# Patient Record
Sex: MALE | Race: WHITE | NOT HISPANIC OR LATINO | URBAN - METROPOLITAN AREA
[De-identification: names, ages, dates, MRNs, and addresses within clinical notes are randomized per-mention and may not be internally consistent; named-entity substitution may affect disease eponyms.]

---

## 2017-03-28 ENCOUNTER — OFFICE VISIT (OUTPATIENT)
Dept: FAMILY MEDICINE | Facility: CLINIC | Age: 27
End: 2017-03-28
Payer: COMMERCIAL

## 2017-03-28 VITALS
HEIGHT: 68 IN | SYSTOLIC BLOOD PRESSURE: 130 MMHG | BODY MASS INDEX: 29.7 KG/M2 | RESPIRATION RATE: 16 BRPM | OXYGEN SATURATION: 99 % | WEIGHT: 196 LBS | DIASTOLIC BLOOD PRESSURE: 77 MMHG | HEART RATE: 70 BPM | TEMPERATURE: 98.4 F

## 2017-03-28 DIAGNOSIS — R10.11 ABDOMINAL PAIN, RIGHT UPPER QUADRANT: Primary | ICD-10-CM

## 2017-03-28 PROBLEM — F10.90 ALCOHOL USE DISORDER: Status: RESOLVED | Noted: 2017-03-28 | Resolved: 2017-03-28

## 2017-03-28 PROBLEM — F10.90 ALCOHOL USE DISORDER: Status: ACTIVE | Noted: 2017-03-28

## 2017-03-28 LAB
BASOPHILS # BLD AUTO: 0 10E9/L (ref 0–0.2)
BASOPHILS NFR BLD AUTO: 0.2 %
DIFFERENTIAL METHOD BLD: NORMAL
EOSINOPHIL # BLD AUTO: 0.1 10E9/L (ref 0–0.7)
EOSINOPHIL NFR BLD AUTO: 2 %
ERYTHROCYTE [DISTWIDTH] IN BLOOD BY AUTOMATED COUNT: 12.5 % (ref 10–15)
HCT VFR BLD AUTO: 47.5 % (ref 40–53)
HGB BLD-MCNC: 16.5 G/DL (ref 13.3–17.7)
LIPASE SERPL-CCNC: 119 U/L (ref 73–393)
LYMPHOCYTES # BLD AUTO: 0.9 10E9/L (ref 0.8–5.3)
LYMPHOCYTES NFR BLD AUTO: 13.2 %
MCH RBC QN AUTO: 32 PG (ref 26.5–33)
MCHC RBC AUTO-ENTMCNC: 34.7 G/DL (ref 31.5–36.5)
MCV RBC AUTO: 92 FL (ref 78–100)
MONOCYTES # BLD AUTO: 0.9 10E9/L (ref 0–1.3)
MONOCYTES NFR BLD AUTO: 13.5 %
NEUTROPHILS # BLD AUTO: 4.7 10E9/L (ref 1.6–8.3)
NEUTROPHILS NFR BLD AUTO: 71.1 %
PLATELET # BLD AUTO: 242 10E9/L (ref 150–450)
RBC # BLD AUTO: 5.15 10E12/L (ref 4.4–5.9)
WBC # BLD AUTO: 6.6 10E9/L (ref 4–11)

## 2017-03-28 PROCEDURE — 80053 COMPREHEN METABOLIC PANEL: CPT | Performed by: NURSE PRACTITIONER

## 2017-03-28 PROCEDURE — 83690 ASSAY OF LIPASE: CPT | Performed by: NURSE PRACTITIONER

## 2017-03-28 PROCEDURE — 82150 ASSAY OF AMYLASE: CPT | Performed by: NURSE PRACTITIONER

## 2017-03-28 PROCEDURE — 85025 COMPLETE CBC W/AUTO DIFF WBC: CPT | Performed by: NURSE PRACTITIONER

## 2017-03-28 PROCEDURE — 36415 COLL VENOUS BLD VENIPUNCTURE: CPT | Performed by: NURSE PRACTITIONER

## 2017-03-28 PROCEDURE — 99204 OFFICE O/P NEW MOD 45 MIN: CPT | Performed by: NURSE PRACTITIONER

## 2017-03-28 NOTE — PROGRESS NOTES
"  SUBJECTIVE:                                                    Chacho Dey is a 26 year old male who presents to clinic today for the following health issues:  Chief Complaint   Patient presents with     Establish Care     Abdominal Pain         He \"has had\" problems with alcohol. He quit drinking 2-3 years ago and then restarted (2-3 months ago).  He restarted as a social thing and then it became nightly.   It then became 3-4 cocktails/whiskey per night.  He stopped drinking 3 days ago.  He does not necessarily consider alcohol an addiction or a problem.  He is motivated to not drink and he is ready to be healthy.    He is in clinic today due to an abdominal concern.  RUQ abdominal pain.  Started 2-3 days ago.  He woke up with the pain.  No fever, chills.  No vomiting, diarrhea.    Getting .  He would like to have a family some day    Dad: healthy.  Mom: passed away, lung cancer, 42 years old.   Chacho is a nonsmoker.  Working full time as an .     Problem list and histories reviewed & adjusted, as indicated.  Additional history: as documented    Patient Active Problem List   Diagnosis     Pain in joint, lower leg     History reviewed. No pertinent surgical history.    Social History   Substance Use Topics     Smoking status: Never Smoker     Smokeless tobacco: Not on file     Alcohol use Yes     Family History   Problem Relation Age of Onset     Lung Cancer Mother      Alzheimer Disease Maternal Grandmother      Heart Failure Paternal Grandfather          No current outpatient prescriptions on file.     No Known Allergies  No lab results found.   BP Readings from Last 3 Encounters:   03/28/17 130/77    Wt Readings from Last 3 Encounters:   03/28/17 196 lb (88.9 kg)           Labs reviewed in EPIC    Reviewed and updated as needed this visit by clinical staff  Tobacco  Allergies  Meds  Med Hx  Surg Hx  Fam Hx  Soc Hx      Reviewed and updated as needed this visit by Provider     " "    ROS:  Constitutional, HEENT, cardiovascular, pulmonary, gi and gu systems are negative, except as otherwise noted.    OBJECTIVE:                                                    /77  Pulse 70  Temp 98.4  F (36.9  C) (Oral)  Resp 16  Ht 5' 7.75\" (1.721 m)  Wt 196 lb (88.9 kg)  SpO2 99%  BMI 30.02 kg/m2  Body mass index is 30.02 kg/(m^2).  Constitutional: healthy, alert and no distress  Cardiovascular: RRR. No murmurs, clicks gallops or rub  HEENT: normal  Neck: supple, no adenopathy  Respiratory: Respirations easy and regular. No respiratory distress noted. Lung sounds clear to auscultation.  Gastrointestinal: abdomen flat, soft, nontender to light or deep palpation. Bowel sounds active in 4 quadrants. No hepatosplenomegaly. No rebound or guarding.  Neurologic: Gait normal. Reflexes normal and symmetric. Sensation grossly WNL.  Psychiatric: mentation appears normal and affect normal/bright         ASSESSMENT/PLAN:                                                    (R10.11) Abdominal pain, right upper quadrant  (primary encounter diagnosis)  Comment: acute  Plan: CBC with platelets and differential,         Comprehensive metabolic panel, Amylase, Lipase        I did stress the importance of abstaining from alcohol.   See below.    Patient Instructions     For your abdominal pain, monitor your symptoms for now.  The blood test results will be back in 1-2 days.  I will let you know what we need to do for follow up.  If at any time your symptoms worsen or you are more concerned about the pain, I would order an abdominal ultrasound.    For now, eat healthy, drink plenty of water/fluids, rest, avoid alcohol, etc.  Follow up with me at any time.       Abdominal Pain  Abdominal pain is pain in the stomach or intestinal area. Everyone has this pain from time to time. In many cases it goes away on its own. But abdominal pain can sometimes be due to a serious problem, such as appendicitis. So it s important " to know when to seek help.  Causes of abdominal pain  There are many possible causes of abdominal pain. Common causes in adults include:    Constipation, diarrhea, or gas    GERD (gastroesophageal reflux disease) movement of stomach acid into the esophagus, also known as acid reflux or heartburn    Peptic ulcer (a sore in the lining of the stomach or small intestine)    Inflammation of the gallbladder, liver, or pancreas    Gallstones or kidney stones    Appendicitis     Obstruction of the intestines     Hernia (bulging of an internal organ through a muscle or other tissue)    Urinary tract infections    In women, menstrual cramps, fibroids, or endometriosis of the uterus    Inflammation or infection of the intestines  Diagnosing the cause of abdominal pain  Your health care provider will examine you to help find the cause of your pain. If needed, tests will be ordered. Because abdominal pain has so many possible causes, it can be hard to discover the reason for the pain. Giving details about your pain can help. Be ready to tell your health care provider where and when you feel the pain and what makes it better or worse. Also mention whether you have other symptoms such as fever, tiredness, nausea, vomiting, or changes in bathroom habits.  Treating abdominal pain  Certain causes of pain, such as appendicitis or a bowel obstruction, need emergency treatment. Other problems can be treated with rest, fluids, or medications. Your health care provider can give you specific instructions for treatment or self-care based on the cause of your pain.  If you have vomiting or diarrhea, sip water or other clear fluids. When you are ready to eat solid foods again, start with small amounts of easy-to-digest, low-fat foods, such as applesauce, toast, or crackers.   When to call the doctor  Call 911 or go to the hospital right away if you:    Can t pass stool and are vomiting    Are vomiting blood or have black, tarry  diarrhea    Also have chest, neck, or shoulder pain    Feel like you are about to pass out    Have pain in your shoulder blades with nausea    Have sudden, excruciating abdominal pain    Have new, severe pain unlike any you have felt before    Have a belly that is rigid, hard, and tender to touch  Call your doctor if you have:    Pain for more than 5 days    Bloating for more than 2 days    Diarrhea for more than 5 days    Fever of 101 F (38.3 C) or higher    Pain that continues to worsen    Unexplained weight loss    Continued lack of appetite    Blood in the stool  How to prevent abdominal pain  Here are some tips to help prevent abdominal pain:    Eat smaller amounts of food at one time.    Avoid greasy, fried, or other high-fat foods.    Avoid foods that give you gas.    Exercise regularly.    Drink plenty of fluids.  To help prevent symptoms of gastroesophageal reflux disease (GERD):    Quit smoking.    Reduce alcohol and certain foods that increase stomach acid.     Lose excess weight.    Finish eating at least 2 hours before you go to bed or lie down.    Elevate the head of your bed.    7315-5232 [employer]. [address] [place] [address]. All rights reserved. This information is not intended as a substitute for professional medical care. Always follow your healthcare professional's instructions.          Total: 30 min spent with the patient, >50% time spent face to face counseling regarding establishing care, discussing abd pain, tests, discussion/AVS review, importance of follow up, etc.    ADAM Phelps [facility]

## 2017-03-28 NOTE — PATIENT INSTRUCTIONS
For your abdominal pain, monitor your symptoms for now.  The blood test results will be back in 1-2 days.  I will let you know what we need to do for follow up.  If at any time your symptoms worsen or you are more concerned about the pain, I would order an abdominal ultrasound.    For now, eat healthy, drink plenty of water/fluids, rest, avoid alcohol, etc.  Follow up with me at any time.       Abdominal Pain  Abdominal pain is pain in the stomach or intestinal area. Everyone has this pain from time to time. In many cases it goes away on its own. But abdominal pain can sometimes be due to a serious problem, such as appendicitis. So it s important to know when to seek help.  Causes of abdominal pain  There are many possible causes of abdominal pain. Common causes in adults include:    Constipation, diarrhea, or gas    GERD (gastroesophageal reflux disease) movement of stomach acid into the esophagus, also known as acid reflux or heartburn    Peptic ulcer (a sore in the lining of the stomach or small intestine)    Inflammation of the gallbladder, liver, or pancreas    Gallstones or kidney stones    Appendicitis     Obstruction of the intestines     Hernia (bulging of an internal organ through a muscle or other tissue)    Urinary tract infections    In women, menstrual cramps, fibroids, or endometriosis of the uterus    Inflammation or infection of the intestines  Diagnosing the cause of abdominal pain  Your health care provider will examine you to help find the cause of your pain. If needed, tests will be ordered. Because abdominal pain has so many possible causes, it can be hard to discover the reason for the pain. Giving details about your pain can help. Be ready to tell your health care provider where and when you feel the pain and what makes it better or worse. Also mention whether you have other symptoms such as fever, tiredness, nausea, vomiting, or changes in bathroom habits.  Treating abdominal pain  Certain  causes of pain, such as appendicitis or a bowel obstruction, need emergency treatment. Other problems can be treated with rest, fluids, or medications. Your health care provider can give you specific instructions for treatment or self-care based on the cause of your pain.  If you have vomiting or diarrhea, sip water or other clear fluids. When you are ready to eat solid foods again, start with small amounts of easy-to-digest, low-fat foods, such as applesauce, toast, or crackers.   When to call the doctor  Call 911 or go to the hospital right away if you:    Can t pass stool and are vomiting    Are vomiting blood or have black, tarry diarrhea    Also have chest, neck, or shoulder pain    Feel like you are about to pass out    Have pain in your shoulder blades with nausea    Have sudden, excruciating abdominal pain    Have new, severe pain unlike any you have felt before    Have a belly that is rigid, hard, and tender to touch  Call your doctor if you have:    Pain for more than 5 days    Bloating for more than 2 days    Diarrhea for more than 5 days    Fever of 101 F (38.3 C) or higher    Pain that continues to worsen    Unexplained weight loss    Continued lack of appetite    Blood in the stool  How to prevent abdominal pain  Here are some tips to help prevent abdominal pain:    Eat smaller amounts of food at one time.    Avoid greasy, fried, or other high-fat foods.    Avoid foods that give you gas.    Exercise regularly.    Drink plenty of fluids.  To help prevent symptoms of gastroesophageal reflux disease (GERD):    Quit smoking.    Reduce alcohol and certain foods that increase stomach acid.     Lose excess weight.    Finish eating at least 2 hours before you go to bed or lie down.    Elevate the head of your bed.    1502-1440 [employer]. [address] [place] [address]. All rights reserved. This information is not intended as a substitute for professional medical care. Always follow  your healthcare professional's instructions.

## 2017-03-28 NOTE — MR AVS SNAPSHOT
After Visit Summary   3/28/2017    Chacho Dey    MRN: [id number]           Patient Information     Date Of Birth          1990        Visit Information        Provider Department      3/28/2017 11:40 AM Kelly Lazaro APRN [facility]        Today's Diagnoses     Abdominal pain, right upper quadrant    -  1      Care Instructions    For your abdominal pain, monitor your symptoms for now.  The blood test results will be back in 1-2 days.  I will let you know what we need to do for follow up.  If at any time your symptoms worsen or you are more concerned about the pain, I would order an abdominal ultrasound.    For now, eat healthy, drink plenty of water/fluids, rest, avoid alcohol, etc.  Follow up with me at any time.       Abdominal Pain  Abdominal pain is pain in the stomach or intestinal area. Everyone has this pain from time to time. In many cases it goes away on its own. But abdominal pain can sometimes be due to a serious problem, such as appendicitis. So it s important to know when to seek help.  Causes of abdominal pain  There are many possible causes of abdominal pain. Common causes in adults include:    Constipation, diarrhea, or gas    GERD (gastroesophageal reflux disease) movement of stomach acid into the esophagus, also known as acid reflux or heartburn    Peptic ulcer (a sore in the lining of the stomach or small intestine)    Inflammation of the gallbladder, liver, or pancreas    Gallstones or kidney stones    Appendicitis     Obstruction of the intestines     Hernia (bulging of an internal organ through a muscle or other tissue)    Urinary tract infections    In women, menstrual cramps, fibroids, or endometriosis of the uterus    Inflammation or infection of the intestines  Diagnosing the cause of abdominal pain  Your health care provider will examine you to help find the cause of your pain. If needed, tests will be ordered. Because abdominal  pain has so many possible causes, it can be hard to discover the reason for the pain. Giving details about your pain can help. Be ready to tell your health care provider where and when you feel the pain and what makes it better or worse. Also mention whether you have other symptoms such as fever, tiredness, nausea, vomiting, or changes in bathroom habits.  Treating abdominal pain  Certain causes of pain, such as appendicitis or a bowel obstruction, need emergency treatment. Other problems can be treated with rest, fluids, or medications. Your health care provider can give you specific instructions for treatment or self-care based on the cause of your pain.  If you have vomiting or diarrhea, sip water or other clear fluids. When you are ready to eat solid foods again, start with small amounts of easy-to-digest, low-fat foods, such as applesauce, toast, or crackers.   When to call the doctor  Call 911 or go to the hospital right away if you:    Can t pass stool and are vomiting    Are vomiting blood or have black, tarry diarrhea    Also have chest, neck, or shoulder pain    Feel like you are about to pass out    Have pain in your shoulder blades with nausea    Have sudden, excruciating abdominal pain    Have new, severe pain unlike any you have felt before    Have a belly that is rigid, hard, and tender to touch  Call your doctor if you have:    Pain for more than 5 days    Bloating for more than 2 days    Diarrhea for more than 5 days    Fever of 101 F (38.3 C) or higher    Pain that continues to worsen    Unexplained weight loss    Continued lack of appetite    Blood in the stool  How to prevent abdominal pain  Here are some tips to help prevent abdominal pain:    Eat smaller amounts of food at one time.    Avoid greasy, fried, or other high-fat foods.    Avoid foods that give you gas.    Exercise regularly.    Drink plenty of fluids.  To help prevent symptoms of gastroesophageal reflux disease (GERD):    Quit  "smoking.    Reduce alcohol and certain foods that increase stomach acid.     Lose excess weight.    Finish eating at least 2 hours before you go to bed or lie down.    Elevate the head of your bed.    2217-4716 [employer]. [address] [place] [address]. All rights reserved. This information is not intended as a substitute for professional medical care. Always follow your healthcare professional's instructions.              Follow-ups after your visit        Who to contact     If you have questions or need follow up information about today's clinic visit or your schedule please contact [facility] directly at [phone].  Normal or non-critical lab and imaging results will be communicated to you by [employer], letter or phone within 4 business days after the clinic has received the results. If you do not hear from us within 7 days, please contact the clinic through [employer] or phone. If you have a critical or abnormal lab result, we will notify you by phone as soon as possible.  Submit refill requests through [employer] or call your pharmacy and they will forward the refill request to us. Please allow 3 business days for your refill to be completed.          Additional Information About Your Visit        [employer] Information     [employer] lets you send messages to your doctor, view your test results, renew your prescriptions, schedule appointments and more. To sign up, go to [email or web address] . Click on \"Log in\" on the left side of the screen, which will take you to the Welcome page. Then click on \"Sign up Now\" on the right side of the page.     You will be asked to enter the access code listed below, as well as some personal information. Please follow the directions to create your username and password.     Your access code is: [email or web address]  Expires: 2017 12:09 PM     Your access code will  in 90 days. If you need help or a new code, please call your [place] " "clinic or [phone].        Care EveryWhere ID     This is your Care EveryWhere ID. This could be used by other organizations to access your [facility] medical records  [id number]        Your Vitals Were     Pulse Temperature Respirations Height Pulse Oximetry BMI (Body Mass Index)    70 98.4  F (36.9  C) (Oral) 16 5' 7.75\" (1.721 m) 99% 30.02 kg/m2       Blood Pressure from Last 3 Encounters:   03/28/17 130/77    Weight from Last 3 Encounters:   03/28/17 196 lb (88.9 kg)              We Performed the Following     Amylase     CBC with platelets and differential     Comprehensive metabolic panel     Lipase        Primary Care Provider    None Specified       No primary provider on file.        Thank you!     Thank you for choosing [facility]  for your care. Our goal is always to provide you with excellent care. Hearing back from our patients is one way we can continue to improve our services. Please take a few minutes to complete the written survey that you may receive in the mail after your visit with us. Thank you!             Your Updated Medication List - Protect others around you: Learn how to safely use, store and throw away your medicines at www.disposemymeds.org.      Notice  As of 3/28/2017 12:09 PM    You have not been prescribed any medications.      "

## 2017-03-29 LAB
ALBUMIN SERPL-MCNC: 4.1 G/DL (ref 3.4–5)
ALP SERPL-CCNC: 75 U/L (ref 40–150)
ALT SERPL W P-5'-P-CCNC: 66 U/L (ref 0–70)
AMYLASE SERPL-CCNC: 45 U/L (ref 30–110)
ANION GAP SERPL CALCULATED.3IONS-SCNC: 6 MMOL/L (ref 3–14)
AST SERPL W P-5'-P-CCNC: 36 U/L (ref 0–45)
BILIRUB SERPL-MCNC: 0.8 MG/DL (ref 0.2–1.3)
BUN SERPL-MCNC: 14 MG/DL (ref 7–30)
CALCIUM SERPL-MCNC: 9.4 MG/DL (ref 8.5–10.1)
CHLORIDE SERPL-SCNC: 105 MMOL/L (ref 94–109)
CO2 SERPL-SCNC: 28 MMOL/L (ref 20–32)
CREAT SERPL-MCNC: 1.11 MG/DL (ref 0.66–1.25)
GFR SERPL CREATININE-BSD FRML MDRD: 80 ML/MIN/1.7M2
GLUCOSE SERPL-MCNC: 88 MG/DL (ref 70–99)
POTASSIUM SERPL-SCNC: 5 MMOL/L (ref 3.4–5.3)
PROT SERPL-MCNC: 7.4 G/DL (ref 6.8–8.8)
SODIUM SERPL-SCNC: 139 MMOL/L (ref 133–144)

## 2017-03-29 NOTE — PROGRESS NOTES
Tomi Flor,    This note is to let you know the excellent news that all of your blood test results came back negative or normal. There is no sign of an infection, anemia, or liver issues. I would recommend that you continue to take good care of yourself with a healthy diet, exercise, and avoiding alcohol.    Let me know if you have any questions or concerns.    Kelly MEDINA CNP

## 2017-11-10 ENCOUNTER — OFFICE VISIT - HEALTHEAST (OUTPATIENT)
Dept: INTERNAL MEDICINE | Facility: CLINIC | Age: 27
End: 2017-11-10

## 2017-11-10 DIAGNOSIS — S39.011A ABDOMINAL MUSCLE STRAIN: ICD-10-CM

## 2017-11-10 ASSESSMENT — MIFFLIN-ST. JEOR: SCORE: 1842.62

## 2017-12-14 ENCOUNTER — COMMUNICATION - HEALTHEAST (OUTPATIENT)
Dept: SCHEDULING | Facility: CLINIC | Age: 27
End: 2017-12-14

## 2019-03-14 ENCOUNTER — COMMUNICATION - HEALTHEAST (OUTPATIENT)
Dept: INTERNAL MEDICINE | Facility: CLINIC | Age: 29
End: 2019-03-14

## 2019-03-29 ENCOUNTER — AMBULATORY - HEALTHEAST (OUTPATIENT)
Dept: INTERNAL MEDICINE | Facility: CLINIC | Age: 29
End: 2019-03-29

## 2019-03-29 ENCOUNTER — OFFICE VISIT - HEALTHEAST (OUTPATIENT)
Dept: INTERNAL MEDICINE | Facility: CLINIC | Age: 29
End: 2019-03-29

## 2019-03-29 ENCOUNTER — COMMUNICATION - HEALTHEAST (OUTPATIENT)
Dept: INTERNAL MEDICINE | Facility: CLINIC | Age: 29
End: 2019-03-29

## 2019-03-29 DIAGNOSIS — Z00.00 ROUTINE GENERAL MEDICAL EXAMINATION AT A HEALTH CARE FACILITY: ICD-10-CM

## 2019-03-29 DIAGNOSIS — R79.89 ABNORMAL LFTS: ICD-10-CM

## 2019-03-29 DIAGNOSIS — F41.1 GENERALIZED ANXIETY DISORDER: ICD-10-CM

## 2019-03-29 LAB
ALBUMIN SERPL-MCNC: 4.1 G/DL (ref 3.5–5)
ALP SERPL-CCNC: 59 U/L (ref 45–120)
ALT SERPL W P-5'-P-CCNC: 90 U/L (ref 0–45)
ANION GAP SERPL CALCULATED.3IONS-SCNC: 10 MMOL/L (ref 5–18)
AST SERPL W P-5'-P-CCNC: 41 U/L (ref 0–40)
BILIRUB SERPL-MCNC: 0.5 MG/DL (ref 0–1)
BUN SERPL-MCNC: 15 MG/DL (ref 8–22)
CALCIUM SERPL-MCNC: 9.5 MG/DL (ref 8.5–10.5)
CHLORIDE BLD-SCNC: 103 MMOL/L (ref 98–107)
CHOLEST SERPL-MCNC: 224 MG/DL
CO2 SERPL-SCNC: 26 MMOL/L (ref 22–31)
CREAT SERPL-MCNC: 1.1 MG/DL (ref 0.7–1.3)
FASTING STATUS PATIENT QL REPORTED: YES
GFR SERPL CREATININE-BSD FRML MDRD: >60 ML/MIN/1.73M2
GLUCOSE BLD-MCNC: 85 MG/DL (ref 70–125)
HDLC SERPL-MCNC: 58 MG/DL
HGB BLD-MCNC: 16.3 G/DL (ref 14–18)
LDLC SERPL CALC-MCNC: 146 MG/DL
POTASSIUM BLD-SCNC: 4.2 MMOL/L (ref 3.5–5)
PROT SERPL-MCNC: 7.2 G/DL (ref 6–8)
SODIUM SERPL-SCNC: 139 MMOL/L (ref 136–145)
TRIGL SERPL-MCNC: 101 MG/DL

## 2019-03-29 ASSESSMENT — MIFFLIN-ST. JEOR: SCORE: 1887.62

## 2019-04-04 ENCOUNTER — COMMUNICATION - HEALTHEAST (OUTPATIENT)
Dept: INTERNAL MEDICINE | Facility: CLINIC | Age: 29
End: 2019-04-04

## 2019-05-03 ENCOUNTER — RECORDS - HEALTHEAST (OUTPATIENT)
Dept: ADMINISTRATIVE | Facility: OTHER | Age: 29
End: 2019-05-03

## 2019-11-07 ENCOUNTER — HEALTH MAINTENANCE LETTER (OUTPATIENT)
Age: 29
End: 2019-11-07

## 2020-03-03 ENCOUNTER — OFFICE VISIT - HEALTHEAST (OUTPATIENT)
Dept: INTERNAL MEDICINE | Facility: CLINIC | Age: 30
End: 2020-03-03

## 2020-03-03 DIAGNOSIS — F41.1 GENERALIZED ANXIETY DISORDER: ICD-10-CM

## 2020-03-03 DIAGNOSIS — R79.89 ABNORMAL LFTS: ICD-10-CM

## 2020-03-03 DIAGNOSIS — E78.00 HYPERCHOLESTEROLEMIA: ICD-10-CM

## 2020-03-03 DIAGNOSIS — R04.0 EPISTAXIS: ICD-10-CM

## 2020-03-03 RX ORDER — ESCITALOPRAM OXALATE 10 MG/1
10 TABLET ORAL DAILY
Qty: 90 TABLET | Refills: 3 | Status: SHIPPED | OUTPATIENT
Start: 2020-03-03

## 2020-03-03 ASSESSMENT — PATIENT HEALTH QUESTIONNAIRE - PHQ9: SUM OF ALL RESPONSES TO PHQ QUESTIONS 1-9: 3

## 2020-11-29 ENCOUNTER — HEALTH MAINTENANCE LETTER (OUTPATIENT)
Age: 30
End: 2020-11-29

## 2021-05-27 ENCOUNTER — RECORDS - HEALTHEAST (OUTPATIENT)
Dept: ADMINISTRATIVE | Facility: CLINIC | Age: 31
End: 2021-05-27

## 2021-05-27 ASSESSMENT — PATIENT HEALTH QUESTIONNAIRE - PHQ9: SUM OF ALL RESPONSES TO PHQ QUESTIONS 1-9: 3

## 2021-05-27 NOTE — TELEPHONE ENCOUNTER
Who is calling:   Patient  Reason for Call:   Patient would like a call back regarding his labs, he received his letter.  Date of last appointment with primary care:  3/29/2019  Okay to leave a detailed message: Yes

## 2021-05-27 NOTE — TELEPHONE ENCOUNTER
I attempted to call him back but was not available at number.  My letter was pretty self-explanatory.  Does he have a specific question?

## 2021-05-27 NOTE — TELEPHONE ENCOUNTER
Called pt. Reread the letter to pt and he feels better. He will get labs checked in 6-8 weeks as advised.

## 2021-05-27 NOTE — TELEPHONE ENCOUNTER
Please call patient re lab results.    Your liver enzymes, AST and ALT, are mildly elevated.  This suggests some mild inflammation in your liver and I suspect is related to your current alcohol use.  You mentioned having several drinks a few times per week.  Any time you are drinking more than 2 beers or 2 ounces of hard alcohol in a day on a regular basis, your liver usually is not too happy.  You really should cut back significantly on your current use.  I would suggest rechecking the liver enzymes in 6-8 weeks after you have made some changes.  You can call at that time and schedule a lab appointment.     Your cholesterol is mildly elevated.  The LDL cholesterol should be under 130 and the total cholesterol should be under 200.  Modifications in diet and exercise should help.  Cutting back on alcohol will help this as well.     Everything else looks fine.  No diabetes.  Normal kidney function.  No anemia.

## 2021-05-31 VITALS — HEIGHT: 68 IN | WEIGHT: 201.08 LBS | BODY MASS INDEX: 30.48 KG/M2

## 2021-06-02 VITALS — BODY MASS INDEX: 31.98 KG/M2 | WEIGHT: 211 LBS | HEIGHT: 68 IN

## 2021-06-04 VITALS
SYSTOLIC BLOOD PRESSURE: 132 MMHG | OXYGEN SATURATION: 98 % | BODY MASS INDEX: 33.7 KG/M2 | HEART RATE: 70 BPM | WEIGHT: 220 LBS | DIASTOLIC BLOOD PRESSURE: 86 MMHG

## 2021-06-06 NOTE — PROGRESS NOTES
Office Visit - Follow Up   Chacho Dey   29 y.o. male    Date of Visit: 3/3/2020    Chief Complaint   Patient presents with     Medication Check     not fasting     Anxiety        Assessment and Plan   1. Generalized anxiety disorder  Anxiety is much better controlled using Lexapro 10 mg daily and tolerating without side effects.  - escitalopram oxalate (LEXAPRO) 10 MG tablet; Take 1 tablet (10 mg total) by mouth daily.  Dispense: 90 tablet; Refill: 3    2. Abnormal LFTs  Mildly elevated LFTs at his physical last year and using more alcohol than ideal.  He understands the importance of trying to cut back and he has good intentions of making changes.  He will work on this, more regular exercise with weight loss and return in 3 to 4 months to have his LFTs rechecked  - Hepatic Profile; Future    3. Hypercholesterolemia  Mildly elevated cholesterol last year.  He would like a few months to start making changes on his diet, exercise and alcohol intake and return for lipid profile this summer.  - Lipid Cascade; Future    4. Epistaxis with chronic nasal congestion.  Recommending he discussed with ENT and names provided.  He has tried Flonase without benefit.  Recommending normal saline spray 3-4 times daily.    Return in about 1 year (around 3/3/2021) for Annual physical.     History of Present Illness   This 29 y.o. old gentleman with history of ADHD and generalized anxiety here to follow-up.  Started on Lexapro 10 mg daily last year and he has found this extremely helpful.  Feeling much less anxious.  Tolerating without side effects.  Denies feeling any depression.  He and his wife just found out that she is pregnant.  They were having some difficulties last year and did see a fertility expert but before further work-up completed, she was successfully pregnant.  On his labs last year, cholesterol was mildly elevated and LFTs also elevated.  He admits to drinking more alcohol than ideal.  He will often have 3-4  drinks several times per week.  He is hoping to cut back on this use.  He also has good intentions to make changes in his diet and try to get more regular exercise and would like to come back this summer to have his labs drawn.  Denies any abdominal pain.  He has had chronic problems with nasal congestion with intermittent epistaxis especially from right nostril.  He is tried Flonase without benefit.  Former chewer of tobacco and remains abstinent.    Review of Systems:  Otherwise, a comprehensive review of systems was negative except as noted.     Medications, Allergies and Problem List   Patient Active Problem List   Diagnosis     ADHD (attention deficit hyperactivity disorder)     Generalized anxiety disorder     Abnormal LFTs     Hypercholesterolemia     Epistaxis       He has a past surgical history that includes Shoulder surgery (Right, 2004) and Knee surgery (2007).    No Known Allergies    Current Outpatient Medications   Medication Sig Dispense Refill     escitalopram oxalate (LEXAPRO) 10 MG tablet Take 1 tablet (10 mg total) by mouth daily. 90 tablet 3     No current facility-administered medications for this visit.         Physical Exam   General Appearance:   Well-appearing young male    /86 (Patient Site: Left Arm, Patient Position: Sitting, Cuff Size: Adult Regular)   Pulse 70   Wt 220 lb (99.8 kg)   SpO2 98%   BMI 33.70 kg/m      HEENT: Normal  Respiratory: Normal respiratory effort.  Lungs are clear with no rales or wheezes.  Heart: Regular rate and rhythm without murmurs, rubs, or gallops.  Abdomen: Abdomen is soft, nontender without guarding, rebound, masses, or hepatosplenomegaly.  Extremities: No peripheral edema.  Neurologic: Grossly nonfocal  Skin: No cyanosis or pallor  Psych: Alert and oriented ×3, mood appropriate.  PHQ 9 score is 3         Additional Information   Social History     Tobacco Use     Smoking status: Former Smoker     Types: Cigarettes     Last attempt to quit:  11/10/2015     Years since quittin.3     Smokeless tobacco: Former User     Types: Chew   Substance Use Topics     Alcohol use: Yes     Comment: 3-4 beers 3-4 x week     Drug use: No              Dev Nelson MD

## 2021-06-14 NOTE — PROGRESS NOTES
"OFFICE VISIT NOTE    Subjective:   Chief Complaint:  Abdominal Pain (X1 day. Discomfort to painful)    26-year-old man is in with complaint of some lower abdominal discomfort.  He is an .  He was in a tight space and twisted yesterday and did feel some pain and points to the suprapubic region.  He has had minor discomfort since then.  No nausea or vomiting.  No cramping.  No anorexia.  No visible swelling.    No current outpatient prescriptions on file.       Review of Systems:  A comprehensive review of systems is negative except for the comments above    Objective:    /80 (Patient Site: Right Arm, Patient Position: Sitting, Cuff Size: Adult Regular)  Pulse 78  Resp 14  Ht 5' 7.75\" (1.721 m)  Wt 201 lb 1.3 oz (91.2 kg)  SpO2 99%  BMI 30.8 kg/m2  GENERAL: No acute distress.  Abdominal wall looks normal.  I am unable to appreciate any visible or palpable hernia.  His discomfort is just suprapubic into the left.  I suspect he has a muscle strain.  There is no right lower quadrant or left lower quadrant tenderness.  No peritoneal signs.  Negative rebound.    Assessment & Plan   Chacho Dey is a 26 y.o. male.    Lower abdominal pain which is suspect is related to a abdominal muscle strain.  I do not see a hernia.  I told him that he has increasing pain or any visible swelling he should recontact us.  As needed use of ibuprofen if the discomfort is mild to moderate.    Diagnoses and all orders for this visit:    Abdominal muscle strain        Franco Leonard MD  Transcription using voice recognition software, may contain typographical errors.    "

## 2021-06-17 NOTE — PATIENT INSTRUCTIONS - HE
Patient Instructions by Dev Nelson MD at 3/29/2019  3:00 PM     Author: Dev Nelson MD Service: -- Author Type: Physician    Filed: 3/29/2019  3:32 PM Encounter Date: 3/29/2019 Status: Signed    : Dev Nelson MD (Physician)         Patient Education   Understanding USDA MyPlate  The USDA (US Department of Agriculture) has guidelines to help you make healthy food choices. These are called MyPlate. MyPlate shows the food groups that make up healthy meals using the image of a place setting. Before you eat, think about the healthiest choices for what to put onto your plate or into your cup or bowl. To learn more about building a healthy plate, visit www.choosemyplate.gov.       The Food Groups    Fruits: Any fruit or 100% fruit juice counts as part of the Fruit Group. Fruits may be fresh, canned, frozen, or dried, and may be whole, cut-up, or pureed. Make half your plate fruits and vegetables.    Vegetables: Any vegetable or 100% vegetable juice counts as a member of the Vegetable Group. Vegetables may be fresh, frozen, canned, or dried. They can be served raw or cooked and may be whole, cut-up, or mashed. Make half your plate fruits and vegetables.     Grains: All foods made from grains are part of the Grains Group. These include wheat, rice, oats, cornmeal, and barley such as bread, pasta, oatmeal, cereal, tortillas, and grits. Grains should be no more than a quarter of your plate. At least half of your grains should be whole grains.    Protein: This group includes meat, poultry, seafood, beans and peas, eggs, processed soy products (like tofu), nuts (including nut butters), and seeds. Make protein choices no more than a quarter of your plate. Meat and poultry choices should be lean or low fat.    Dairy: All fluid milk products and foods made from milk that contain calcium, like yogurt and cheese are part of the Dairy Group. (Foods that have little calcium, such as cream, butter,  and cream cheese, are not part of the group.) Most dairy choices should be low-fat or fat-free.    Oils: These are fats that are liquid at room temperature. They include canola, corn, olive, soybean, and sunflower oil. Foods that are mainly oil include mayonnaise, certain salad dressings, and soft margarines. You should have only 5 to 7 teaspoons of oils a day. You probably already get this much from the food you eat.  Use [employer] to Help Build Your Meals  The Daily Deals for [employer] can help you plan and track your meals and activity. You can look up individual foods to see or compare their nutritional value. You can get guidelines for what and how much you should eat. You can compare your food choices. And you can assess personal physical activities and see ways you can improve. Go to www.[place].gov/supertracker/.    3926-6633 [employer]. [address] [place] [address], [place] [address]. All rights reserved. This information is not intended as a substitute for professional medical care. Always follow your healthcare professional's instructions.

## 2021-06-19 NOTE — LETTER
Letter by Dev Nelson MD at      Author: Dev Nelson MD Service: -- Author Type: --    Filed:  Encounter Date: 3/29/2019 Status: (Other)         Chacho Dey  [address] [place] [id number]             March 29, 2019         Dear Chacho,    Below are the results from your recent visit:    Resulted Orders   Comprehensive Metabolic Panel   Result Value Ref Range    Sodium 139 136 - 145 mmol/L    Potassium 4.2 3.5 - 5.0 mmol/L    Chloride 103 98 - 107 mmol/L    CO2 26 22 - 31 mmol/L    Anion Gap, Calculation 10 5 - 18 mmol/L    Glucose 85 70 - 125 mg/dL    BUN 15 8 - 22 mg/dL    Creatinine 1.10 0.70 - 1.30 mg/dL    GFR MDRD Af Amer >60 >60 mL/min/1.73m2    GFR MDRD Non Af Amer >60 >60 mL/min/1.73m2    Bilirubin, Total 0.5 0.0 - 1.0 mg/dL    Calcium 9.5 8.5 - 10.5 mg/dL    Protein, Total 7.2 6.0 - 8.0 g/dL    Albumin 4.1 3.5 - 5.0 g/dL    Alkaline Phosphatase 59 45 - 120 U/L    AST 41 (H) 0 - 40 U/L    ALT 90 (H) 0 - 45 U/L    Narrative    Fasting Glucose reference range is 70-99 mg/dL per  American Diabetes Association (ADA) guidelines.   Lipid Cascade   Result Value Ref Range    Cholesterol 224 (H) <=199 mg/dL    Triglycerides 101 <=149 mg/dL    HDL Cholesterol 58 >=40 mg/dL    LDL Calculated 146 (H) <=129 mg/dL    Patient Fasting > 8hrs? Yes    Hemoglobin   Result Value Ref Range    Hemoglobin 16.3 14.0 - 18.0 g/dL       Your liver enzymes, AST and ALT, are mildly elevated.  This suggests some mild inflammation in your liver and I suspect is related to your current alcohol use.  You mentioned having several drinks a few times per week.  Any time you are drinking more than 2 beers or 2 ounces of hard alcohol in a day on a regular basis, your liver usually is not too happy.  You really should cut back significantly on your current use.  I would suggest rechecking the liver enzymes in 6-8 weeks after you have made some changes.  You can call at that time and schedule a lab  appointment.    Your cholesterol is mildly elevated.  The LDL cholesterol should be under 130 and the total cholesterol should be under 200.  Modifications in diet and exercise should help.  Cutting back on alcohol will help this as well.    Everything else looks fine.  No diabetes.  Normal kidney function.  No anemia.    Please call with questions or contact us using [employer].    Sincerely,        Electronically signed by Dev Nelson MD

## 2021-06-24 NOTE — TELEPHONE ENCOUNTER
Upcoming Appointment Question  When is the appointment: 3/22/19  What is your appointment for?: Annual physical  Who is your appointment scheduled with?: PCP only  What is your question/concern?: Patient stated he would like to know if he can talk about fertility and anxiety issues at his upcoming appointment or does he need a separate appointment.  Okay to leave a detailed message?: Yes  [phone]

## 2021-06-24 NOTE — TELEPHONE ENCOUNTER
These problems can certainly be addressed at the time of his physical.  No need to make separate appointment.  However, I will usually refer to a specialist regarding fertility issues.

## 2021-06-27 NOTE — PROGRESS NOTES
"Progress Notes by Dev Nelson MD at 3/29/2019  3:00 PM     Author: Dev Nelson MD Service: -- Author Type: Physician    Filed: 3/29/2019  4:06 PM Encounter Date: 3/29/2019 Status: Signed    : Dev Nelson MD (Physician)       MALE PREVENTATIVE EXAM    Assessment and Plan:       1. Routine general medical examination at a health care facility  Past, family, and social history reviewed and updated.  Healthy habits discussed.  Appropriate preventative measures and counseling addressed.  Recommended screening tests discussed and ordered.  - Comprehensive Metabolic Panel  - Lipid Cascade  - Hemoglobin    2. Generalized anxiety disorder  Previously did well with SSRI.  Restart Lexapro 10 mg daily to help with generalized anxiety.  Depending on response, he may want to reestablish with a therapist which he also found helpful in the past.  - escitalopram oxalate (LEXAPRO) 10 MG tablet; Take 1 tablet (10 mg total) by mouth daily.  Dispense: 30 tablet; Refill: 11    3.  Information on fertility clinic provided.  However, he and his wife have only been trying for 3 months and I reassured him that it may take up to 12 months for success.    Next follow up:  Return in 4 months (on 7/29/2019) for Recheck.    Immunization Review  Adult Imm Review: No immunizations due today  Recommending annual flu shot in the future      I discussed the following with the patient:   Adult Healthy Living: Importance of regular exercise  Healthy nutrition       The patient was counseled and encouraged to consider modifying their diet and eating habits. He was provided with information on recommended healthy diet options.        Subjective:   Chief Complaint: Chacho Dey is an 28 y.o. male here for a preventative health visit.     HPI: Experiencing increasing anxiety.  Feeling \"on edge\" all the time.  Worrying about things.  Interfering with work and sleeping.  Denies feeling depressed.  Previously on an " "SSRI.    Healthy Habits  Are you taking a daily aspirin? No  Do you typically exercising at least 40 min, 3-4 times per week?  NO  Do you usually eat at least 4 servings of fruit and vegetables a day, include whole grains and fiber and avoid regularly eating high fat foods? NO  Have you had an eye exam in the past two years? NO  Do you see a dentist twice per year? Yes  Do you have any concerns regarding sleep? No    Safety Screen  If you own firearms, are they secured in a locked gun cabinet or with trigger locks? Yes  Do you feel you are safe where you are living?: Yes (3/29/2019  2:57 PM)  Do you feel you are safe in your relationship(s)?: Yes (3/29/2019  2:57 PM)      Review of Systems:  Please see above.  The rest of the review of systems are negative for all systems.     Cancer Screening     Patient has no health maintenance due at this time              History     Reviewed By Date/Time Sections Reviewed    Dev Nelson MD 3/29/2019  3:01 PM Medical, Surgical, Tobacco, Alcohol, Drug Use, Sexual Activity, Family, Social Documentation    Judie Rome CMA 3/29/2019  2:57 PM Tobacco, Alcohol, Drug Use, Sexual Activity            Objective:   Vital Signs:   Visit Vitals  /72 (Patient Site: Left Arm, Patient Position: Sitting, Cuff Size: Adult Large)   Pulse 79   Ht 5' 7.75\" (1.721 m)   Wt 211 lb (95.7 kg)   SpO2 98%   BMI 32.32 kg/m           PHYSICAL EXAM  EYES: Eyelids, conjunctiva, and sclera were normal. Pupils were normal. Cornea, iris, and lens were normal bilaterally.  HEAD, EARS, NOSE, MOUTH, AND THROAT: Head and face were normal. Nose appearance was normal and there was no discharge. Oropharynx was normal.  NECK: Neck appearance was normal. There were no neck masses and the thyroid was not enlarged and no nodules are felt.  No lymphadenopathy.  RESPIRATORY: Breathing pattern was normal and the chest moved symmetrically.  Percussion/auscultatory percussion was normal.  Lung sounds were " normal and there were no rales or wheezes.  CARDIOVASCULAR: Heart rate and rhythm were normal.  S1 and S2 were normal and there were no extra sounds or murmurs. Peripheral pulses in arms and legs were normal.  Jugular venous pressure was normal.  There was no peripheral edema.   GASTROINTESTINAL: The abdomen was normal in contour.  Bowel sounds were present.   Palpation detected no tenderness, mass, or enlarged organs.   GENITALIA: No penile or testicular lesions.  No inguinal hernia.  MUSCULOSKELETAL: Skeletal configuration was normal and muscle mass was normal for age. Joint appearance was overall normal.  LYMPHATIC: There were no enlarged nodes.  SKIN/HAIR/NAILS: Skin color was normal.  Hair and nails were normal.There were no skin lesions.  NEUROLOGIC: The patient was alert and oriented to person, place, time, and circumstance. Speech was normal. Cranial nerves were normal. Motor strength was normal for age. The patient was normally coordinated.  Sensation intact.  PSYCHIATRIC: PHQ 9 score is 0  DE-7 score is 17           Medication List           Accurate as of 3/29/19  4:06 PM. If you have any questions, ask your nurse or doctor.               START taking these medications    escitalopram oxalate 10 MG tablet  Also known as:  LEXAPRO  INSTRUCTIONS:  Take 1 tablet (10 mg total) by mouth daily.  Started by:  Dev Nelson MD              Where to Get Your Medications      [facility] [address] [place] [address] & [address] [address]    Phone:  [phone]     escitalopram oxalate 10 MG tablet         Additional Screenings Completed Today:

## 2021-09-25 ENCOUNTER — HEALTH MAINTENANCE LETTER (OUTPATIENT)
Age: 31
End: 2021-09-25

## 2022-01-15 ENCOUNTER — HEALTH MAINTENANCE LETTER (OUTPATIENT)
Age: 32
End: 2022-01-15

## 2022-12-26 ENCOUNTER — HEALTH MAINTENANCE LETTER (OUTPATIENT)
Age: 32
End: 2022-12-26

## 2023-04-16 ENCOUNTER — HEALTH MAINTENANCE LETTER (OUTPATIENT)
Age: 33
End: 2023-04-16